# Patient Record
Sex: MALE | Race: WHITE | NOT HISPANIC OR LATINO
[De-identification: names, ages, dates, MRNs, and addresses within clinical notes are randomized per-mention and may not be internally consistent; named-entity substitution may affect disease eponyms.]

---

## 2017-05-26 ENCOUNTER — APPOINTMENT (OUTPATIENT)
Dept: OPHTHALMOLOGY | Facility: CLINIC | Age: 72
End: 2017-05-26

## 2017-05-26 DIAGNOSIS — H26.492 OTHER SECONDARY CATARACT, LEFT EYE: ICD-10-CM

## 2017-05-26 DIAGNOSIS — H35.373 PUCKERING OF MACULA, BILATERAL: ICD-10-CM

## 2017-05-26 DIAGNOSIS — H43.813 VITREOUS DEGENERATION, BILATERAL: ICD-10-CM

## 2018-05-08 ENCOUNTER — OUTPATIENT (OUTPATIENT)
Dept: OUTPATIENT SERVICES | Facility: HOSPITAL | Age: 73
LOS: 1 days | End: 2018-05-08
Payer: COMMERCIAL

## 2018-05-08 ENCOUNTER — RESULT REVIEW (OUTPATIENT)
Age: 73
End: 2018-05-08

## 2018-05-08 DIAGNOSIS — C61 MALIGNANT NEOPLASM OF PROSTATE: ICD-10-CM

## 2018-05-08 LAB — SURGICAL PATHOLOGY STUDY: SIGNIFICANT CHANGE UP

## 2018-05-08 PROCEDURE — 88321 CONSLTJ&REPRT SLD PREP ELSWR: CPT

## 2018-05-30 ENCOUNTER — RESULT REVIEW (OUTPATIENT)
Age: 73
End: 2018-05-30

## 2018-05-30 ENCOUNTER — INPATIENT (INPATIENT)
Facility: HOSPITAL | Age: 73
LOS: 1 days | Discharge: HOME | DRG: 708 | End: 2018-06-01
Attending: UROLOGY | Admitting: UROLOGY
Payer: COMMERCIAL

## 2018-05-30 VITALS
WEIGHT: 166.23 LBS | OXYGEN SATURATION: 98 % | HEIGHT: 70.5 IN | RESPIRATION RATE: 16 BRPM | DIASTOLIC BLOOD PRESSURE: 71 MMHG | TEMPERATURE: 97 F | SYSTOLIC BLOOD PRESSURE: 131 MMHG | HEART RATE: 58 BPM

## 2018-05-30 DIAGNOSIS — Z98.890 OTHER SPECIFIED POSTPROCEDURAL STATES: Chronic | ICD-10-CM

## 2018-05-30 DIAGNOSIS — Z90.49 ACQUIRED ABSENCE OF OTHER SPECIFIED PARTS OF DIGESTIVE TRACT: Chronic | ICD-10-CM

## 2018-05-30 LAB
ANION GAP SERPL CALC-SCNC: 16 MMOL/L — SIGNIFICANT CHANGE UP (ref 5–17)
BUN SERPL-MCNC: 15 MG/DL — SIGNIFICANT CHANGE UP (ref 7–23)
CALCIUM SERPL-MCNC: 9.3 MG/DL — SIGNIFICANT CHANGE UP (ref 8.4–10.5)
CHLORIDE SERPL-SCNC: 98 MMOL/L — SIGNIFICANT CHANGE UP (ref 96–108)
CO2 SERPL-SCNC: 26 MMOL/L — SIGNIFICANT CHANGE UP (ref 22–31)
CREAT SERPL-MCNC: 0.86 MG/DL — SIGNIFICANT CHANGE UP (ref 0.5–1.3)
GLUCOSE SERPL-MCNC: 139 MG/DL — HIGH (ref 70–99)
HCT VFR BLD CALC: 39.8 % — SIGNIFICANT CHANGE UP (ref 39–50)
HGB BLD-MCNC: 13.2 G/DL — SIGNIFICANT CHANGE UP (ref 13–17)
MCHC RBC-ENTMCNC: 30.4 PG — SIGNIFICANT CHANGE UP (ref 27–34)
MCHC RBC-ENTMCNC: 33.2 G/DL — SIGNIFICANT CHANGE UP (ref 32–36)
MCV RBC AUTO: 91.7 FL — SIGNIFICANT CHANGE UP (ref 80–100)
PLATELET # BLD AUTO: 206 K/UL — SIGNIFICANT CHANGE UP (ref 150–400)
POTASSIUM SERPL-MCNC: 3.7 MMOL/L — SIGNIFICANT CHANGE UP (ref 3.5–5.3)
POTASSIUM SERPL-SCNC: 3.7 MMOL/L — SIGNIFICANT CHANGE UP (ref 3.5–5.3)
RBC # BLD: 4.34 M/UL — SIGNIFICANT CHANGE UP (ref 4.2–5.8)
RBC # FLD: 14.8 % — SIGNIFICANT CHANGE UP (ref 10.3–16.9)
SODIUM SERPL-SCNC: 140 MMOL/L — SIGNIFICANT CHANGE UP (ref 135–145)
WBC # BLD: 12.2 K/UL — HIGH (ref 3.8–10.5)
WBC # FLD AUTO: 12.2 K/UL — HIGH (ref 3.8–10.5)

## 2018-05-30 RX ORDER — SODIUM CHLORIDE 9 MG/ML
1000 INJECTION INTRAMUSCULAR; INTRAVENOUS; SUBCUTANEOUS
Qty: 0 | Refills: 0 | Status: DISCONTINUED | OUTPATIENT
Start: 2018-05-30 | End: 2018-06-01

## 2018-05-30 RX ORDER — DIAZEPAM 5 MG
5 TABLET ORAL THREE TIMES A DAY
Qty: 0 | Refills: 0 | Status: DISCONTINUED | OUTPATIENT
Start: 2018-05-30 | End: 2018-06-01

## 2018-05-30 RX ORDER — METOPROLOL TARTRATE 50 MG
50 TABLET ORAL
Qty: 0 | Refills: 0 | Status: DISCONTINUED | OUTPATIENT
Start: 2018-05-30 | End: 2018-06-01

## 2018-05-30 RX ORDER — LIDOCAINE 4 G/100G
1 CREAM TOPICAL
Qty: 0 | Refills: 0 | Status: DISCONTINUED | OUTPATIENT
Start: 2018-05-30 | End: 2018-06-01

## 2018-05-30 RX ORDER — ACETAMINOPHEN 500 MG
650 TABLET ORAL EVERY 6 HOURS
Qty: 0 | Refills: 0 | Status: DISCONTINUED | OUTPATIENT
Start: 2018-05-30 | End: 2018-06-01

## 2018-05-30 RX ORDER — OXYBUTYNIN CHLORIDE 5 MG
5 TABLET ORAL THREE TIMES A DAY
Qty: 0 | Refills: 0 | Status: DISCONTINUED | OUTPATIENT
Start: 2018-05-30 | End: 2018-06-01

## 2018-05-30 RX ORDER — HYDROMORPHONE HYDROCHLORIDE 2 MG/ML
0.5 INJECTION INTRAMUSCULAR; INTRAVENOUS; SUBCUTANEOUS ONCE
Qty: 0 | Refills: 0 | Status: DISCONTINUED | OUTPATIENT
Start: 2018-05-30 | End: 2018-06-01

## 2018-05-30 RX ORDER — CEFTRIAXONE 500 MG/1
1 INJECTION, POWDER, FOR SOLUTION INTRAMUSCULAR; INTRAVENOUS EVERY 24 HOURS
Qty: 0 | Refills: 0 | Status: COMPLETED | OUTPATIENT
Start: 2018-05-30 | End: 2018-05-31

## 2018-05-30 RX ORDER — ONDANSETRON 8 MG/1
4 TABLET, FILM COATED ORAL EVERY 6 HOURS
Qty: 0 | Refills: 0 | Status: DISCONTINUED | OUTPATIENT
Start: 2018-05-30 | End: 2018-06-01

## 2018-05-30 RX ORDER — ZOLPIDEM TARTRATE 10 MG/1
5 TABLET ORAL AT BEDTIME
Qty: 0 | Refills: 0 | Status: DISCONTINUED | OUTPATIENT
Start: 2018-05-30 | End: 2018-06-01

## 2018-05-30 RX ORDER — SENNA PLUS 8.6 MG/1
2 TABLET ORAL AT BEDTIME
Qty: 0 | Refills: 0 | Status: DISCONTINUED | OUTPATIENT
Start: 2018-05-30 | End: 2018-06-01

## 2018-05-30 RX ORDER — VALSARTAN 80 MG/1
320 TABLET ORAL DAILY
Qty: 0 | Refills: 0 | Status: DISCONTINUED | OUTPATIENT
Start: 2018-05-30 | End: 2018-06-01

## 2018-05-30 RX ORDER — OXYCODONE AND ACETAMINOPHEN 5; 325 MG/1; MG/1
2 TABLET ORAL EVERY 6 HOURS
Qty: 0 | Refills: 0 | Status: DISCONTINUED | OUTPATIENT
Start: 2018-05-30 | End: 2018-06-01

## 2018-05-30 RX ORDER — OXYCODONE AND ACETAMINOPHEN 5; 325 MG/1; MG/1
1 TABLET ORAL EVERY 4 HOURS
Qty: 0 | Refills: 0 | Status: DISCONTINUED | OUTPATIENT
Start: 2018-05-30 | End: 2018-06-01

## 2018-05-30 RX ORDER — ATORVASTATIN CALCIUM 80 MG/1
80 TABLET, FILM COATED ORAL AT BEDTIME
Qty: 0 | Refills: 0 | Status: DISCONTINUED | OUTPATIENT
Start: 2018-05-30 | End: 2018-06-01

## 2018-05-30 RX ORDER — DOCUSATE SODIUM 100 MG
100 CAPSULE ORAL THREE TIMES A DAY
Qty: 0 | Refills: 0 | Status: DISCONTINUED | OUTPATIENT
Start: 2018-05-30 | End: 2018-06-01

## 2018-05-30 RX ADMIN — SENNA PLUS 2 TABLET(S): 8.6 TABLET ORAL at 21:23

## 2018-05-30 RX ADMIN — Medication 5 MILLIGRAM(S): at 10:12

## 2018-05-30 RX ADMIN — Medication 650 MILLIGRAM(S): at 16:05

## 2018-05-30 RX ADMIN — Medication 50 MILLIGRAM(S): at 21:24

## 2018-05-30 RX ADMIN — ATORVASTATIN CALCIUM 80 MILLIGRAM(S): 80 TABLET, FILM COATED ORAL at 22:30

## 2018-05-30 RX ADMIN — ZOLPIDEM TARTRATE 5 MILLIGRAM(S): 10 TABLET ORAL at 22:30

## 2018-05-30 RX ADMIN — Medication 650 MILLIGRAM(S): at 15:05

## 2018-05-30 RX ADMIN — Medication 100 MILLIGRAM(S): at 21:24

## 2018-05-30 RX ADMIN — Medication 100 MILLIGRAM(S): at 14:43

## 2018-05-30 RX ADMIN — Medication 5 MILLIGRAM(S): at 18:39

## 2018-05-30 RX ADMIN — CEFTRIAXONE 100 GRAM(S): 500 INJECTION, POWDER, FOR SOLUTION INTRAMUSCULAR; INTRAVENOUS at 14:43

## 2018-05-30 RX ADMIN — Medication 5 MILLIGRAM(S): at 10:14

## 2018-05-30 RX ADMIN — Medication 5 MILLIGRAM(S): at 15:05

## 2018-05-30 RX ADMIN — Medication 5 MILLIGRAM(S): at 11:53

## 2018-05-30 NOTE — H&P ADULT - HISTORY OF PRESENT ILLNESS
71 yo M w/ PMH a-fib s/p ablation, CHF, HTN, HLD with prostate Ca here for RALP. Denies f/c/n/v/cp/sob.

## 2018-05-30 NOTE — H&P ADULT - ASSESSMENT
73 yo M with h/o afib, chf, htn, hld, prostate Ca here for robotic assisted laparoscopic prostatectomy  -to OR for RALP  -admit to floor post op  -npo/ivf  -HSQ/SCDs

## 2018-05-30 NOTE — PROGRESS NOTE ADULT - SUBJECTIVE AND OBJECTIVE BOX
Postop:  Pt denies chest pain, SOB, NV or severe abdominal pain    Vital Signs Last 24 Hrs  T(C): 36.1 (30 May 2018 06:16), Max: 36.1 (30 May 2018 06:16)  T(F): 97 (30 May 2018 06:16), Max: 97 (30 May 2018 06:16)  HR: 82 (30 May 2018 09:41) (58 - 82)  BP: 124/61 (30 May 2018 09:41) (124/61 - 144/72)  BP(mean): 87 (30 May 2018 09:41) (87 - 101)  RR: 19 (30 May 2018 09:41) (11 - 19)  SpO2: 98% (30 May 2018 09:41) (97% - 98%)    I&O's Summary    30 May 2018 07:01  -  30 May 2018 10:55  --------------------------------------------------------  IN: 0 mL / OUT: 200 mL / NET: -200 mL    Gen: NAD  Abd: softly distended, judy TTP, incisions CDI  : hamm draining clear                           13.2   12.2  )-----------( 206      ( 30 May 2018 10:14 )             39.8     05-30    140  |  98  |  15  ----------------------------<  139<H>  3.7   |  26  |  0.86    Ca    9.3      30 May 2018 10:14      cultures    A/P: 72M hx cap s/p RALP 5/30  1- Hamm- monitor uop  2. GLORIA- monitor output  3- Clear diet  4- Ceftriaxone    5- Pain meds PRN  6- OOB amb, IS

## 2018-05-31 DIAGNOSIS — C61 MALIGNANT NEOPLASM OF PROSTATE: ICD-10-CM

## 2018-05-31 LAB
ANION GAP SERPL CALC-SCNC: 12 MMOL/L — SIGNIFICANT CHANGE UP (ref 5–17)
BASOPHILS NFR BLD AUTO: 0.1 % — SIGNIFICANT CHANGE UP (ref 0–2)
BUN SERPL-MCNC: 12 MG/DL — SIGNIFICANT CHANGE UP (ref 7–23)
CALCIUM SERPL-MCNC: 8.9 MG/DL — SIGNIFICANT CHANGE UP (ref 8.4–10.5)
CHLORIDE SERPL-SCNC: 99 MMOL/L — SIGNIFICANT CHANGE UP (ref 96–108)
CO2 SERPL-SCNC: 27 MMOL/L — SIGNIFICANT CHANGE UP (ref 22–31)
CREAT FLD-MCNC: 0.7 MG/DL — SIGNIFICANT CHANGE UP
CREAT SERPL-MCNC: 0.7 MG/DL — SIGNIFICANT CHANGE UP (ref 0.5–1.3)
EOSINOPHIL NFR BLD AUTO: 0.4 % — SIGNIFICANT CHANGE UP (ref 0–6)
GLUCOSE SERPL-MCNC: 105 MG/DL — HIGH (ref 70–99)
HCT VFR BLD CALC: 35.5 % — LOW (ref 39–50)
HGB BLD-MCNC: 11.8 G/DL — LOW (ref 13–17)
LYMPHOCYTES # BLD AUTO: 14.7 % — SIGNIFICANT CHANGE UP (ref 13–44)
MAGNESIUM SERPL-MCNC: 2.1 MG/DL — SIGNIFICANT CHANGE UP (ref 1.6–2.6)
MCHC RBC-ENTMCNC: 30.7 PG — SIGNIFICANT CHANGE UP (ref 27–34)
MCHC RBC-ENTMCNC: 33.2 G/DL — SIGNIFICANT CHANGE UP (ref 32–36)
MCV RBC AUTO: 92.4 FL — SIGNIFICANT CHANGE UP (ref 80–100)
MONOCYTES NFR BLD AUTO: 14.3 % — HIGH (ref 2–14)
NEUTROPHILS NFR BLD AUTO: 70.5 % — SIGNIFICANT CHANGE UP (ref 43–77)
PHOSPHATE SERPL-MCNC: 4 MG/DL — SIGNIFICANT CHANGE UP (ref 2.5–4.5)
PLATELET # BLD AUTO: 201 K/UL — SIGNIFICANT CHANGE UP (ref 150–400)
POTASSIUM SERPL-MCNC: 3.2 MMOL/L — LOW (ref 3.5–5.3)
POTASSIUM SERPL-SCNC: 3.2 MMOL/L — LOW (ref 3.5–5.3)
RBC # BLD: 3.84 M/UL — LOW (ref 4.2–5.8)
RBC # FLD: 14.8 % — SIGNIFICANT CHANGE UP (ref 10.3–16.9)
SODIUM SERPL-SCNC: 138 MMOL/L — SIGNIFICANT CHANGE UP (ref 135–145)
WBC # BLD: 9.7 K/UL — SIGNIFICANT CHANGE UP (ref 3.8–10.5)
WBC # FLD AUTO: 9.7 K/UL — SIGNIFICANT CHANGE UP (ref 3.8–10.5)

## 2018-05-31 RX ORDER — POTASSIUM CHLORIDE 20 MEQ
10 PACKET (EA) ORAL
Qty: 0 | Refills: 0 | Status: COMPLETED | OUTPATIENT
Start: 2018-05-31 | End: 2018-05-31

## 2018-05-31 RX ORDER — POTASSIUM CHLORIDE 20 MEQ
20 PACKET (EA) ORAL ONCE
Qty: 0 | Refills: 0 | Status: COMPLETED | OUTPATIENT
Start: 2018-05-31 | End: 2018-05-31

## 2018-05-31 RX ADMIN — Medication 20 MILLIEQUIVALENT(S): at 07:43

## 2018-05-31 RX ADMIN — Medication 100 MILLIEQUIVALENT(S): at 10:45

## 2018-05-31 RX ADMIN — Medication 650 MILLIGRAM(S): at 07:50

## 2018-05-31 RX ADMIN — Medication 100 MILLIGRAM(S): at 21:35

## 2018-05-31 RX ADMIN — VALSARTAN 320 MILLIGRAM(S): 80 TABLET ORAL at 05:14

## 2018-05-31 RX ADMIN — Medication 5 MILLIGRAM(S): at 13:37

## 2018-05-31 RX ADMIN — Medication 50 MILLIGRAM(S): at 21:35

## 2018-05-31 RX ADMIN — Medication 100 MILLIGRAM(S): at 13:21

## 2018-05-31 RX ADMIN — SENNA PLUS 2 TABLET(S): 8.6 TABLET ORAL at 21:35

## 2018-05-31 RX ADMIN — Medication 5 MILLIGRAM(S): at 02:40

## 2018-05-31 RX ADMIN — Medication 5 MILLIGRAM(S): at 20:34

## 2018-05-31 RX ADMIN — Medication 5 MILLIGRAM(S): at 18:00

## 2018-05-31 RX ADMIN — ATORVASTATIN CALCIUM 80 MILLIGRAM(S): 80 TABLET, FILM COATED ORAL at 21:35

## 2018-05-31 RX ADMIN — Medication 100 MILLIEQUIVALENT(S): at 08:47

## 2018-05-31 RX ADMIN — Medication 100 MILLIEQUIVALENT(S): at 07:43

## 2018-05-31 RX ADMIN — Medication 650 MILLIGRAM(S): at 01:00

## 2018-05-31 RX ADMIN — Medication 650 MILLIGRAM(S): at 00:17

## 2018-05-31 RX ADMIN — CEFTRIAXONE 100 GRAM(S): 500 INJECTION, POWDER, FOR SOLUTION INTRAMUSCULAR; INTRAVENOUS at 13:21

## 2018-05-31 RX ADMIN — Medication 650 MILLIGRAM(S): at 06:50

## 2018-05-31 RX ADMIN — Medication 100 MILLIGRAM(S): at 05:14

## 2018-05-31 RX ADMIN — Medication 5 MILLIGRAM(S): at 08:09

## 2018-05-31 RX ADMIN — Medication 50 MILLIGRAM(S): at 08:09

## 2018-05-31 RX ADMIN — Medication 5 MILLIGRAM(S): at 00:17

## 2018-05-31 NOTE — PROGRESS NOTE ADULT - SUBJECTIVE AND OBJECTIVE BOX
AM Note    No acute events overnight.      Vital Signs Last 24 Hrs  T(C): 36.4 (05-31-18 @ 01:27), Max: 36.9 (05-30-18 @ 20:48)  T(F): 97.6 (05-31-18 @ 01:27), Max: 98.5 (05-30-18 @ 20:48)  HR: 66 (05-31-18 @ 01:27) (58 - 85)  BP: 139/90 (05-31-18 @ 01:27) (124/61 - 160/86)  BP(mean): 117 (05-30-18 @ 10:37) (87 - 117)  RR: 16 (05-31-18 @ 01:27) (8 - 27)  SpO2: 100% (05-31-18 @ 01:27) (96% - 100%)     30 May 2018 10:14    140    |  98     |  15     ----------------------------<  139    3.7     |  26     |  0.86     Ca    9.3        30 May 2018 10:14                            13.2   12.2  )-----------( 206      ( 30 May 2018 10:14 )             39.8         I&O's Summary    30 May 2018 07:01  -  31 May 2018 04:31  --------------------------------------------------------  IN: 1250 mL / OUT: 2750 mL / NET: -1500 mL          PHYSICAL EXAM:    GEN: NAD  ABD: ntnd, soft, inc cdi, GLORIA in place draining serosanguinous  : hamm in place draining clear

## 2018-05-31 NOTE — PROGRESS NOTE ADULT - PROBLEM SELECTOR PLAN 1
-hansel, monitor uop  -GLORIA, monitor op  -diet clears  -pain meds prn  -oob, amb  -continue present care

## 2018-06-01 ENCOUNTER — TRANSCRIPTION ENCOUNTER (OUTPATIENT)
Age: 73
End: 2018-06-01

## 2018-06-01 VITALS
SYSTOLIC BLOOD PRESSURE: 160 MMHG | OXYGEN SATURATION: 99 % | HEART RATE: 67 BPM | TEMPERATURE: 98 F | DIASTOLIC BLOOD PRESSURE: 82 MMHG | RESPIRATION RATE: 17 BRPM

## 2018-06-01 LAB
ANION GAP SERPL CALC-SCNC: 11 MMOL/L — SIGNIFICANT CHANGE UP (ref 5–17)
BASOPHILS NFR BLD AUTO: 0.4 % — SIGNIFICANT CHANGE UP (ref 0–2)
BUN SERPL-MCNC: 9 MG/DL — SIGNIFICANT CHANGE UP (ref 7–23)
CALCIUM SERPL-MCNC: 9 MG/DL — SIGNIFICANT CHANGE UP (ref 8.4–10.5)
CHLORIDE SERPL-SCNC: 102 MMOL/L — SIGNIFICANT CHANGE UP (ref 96–108)
CO2 SERPL-SCNC: 29 MMOL/L — SIGNIFICANT CHANGE UP (ref 22–31)
CREAT SERPL-MCNC: 0.82 MG/DL — SIGNIFICANT CHANGE UP (ref 0.5–1.3)
EOSINOPHIL NFR BLD AUTO: 2.3 % — SIGNIFICANT CHANGE UP (ref 0–6)
GLUCOSE SERPL-MCNC: 95 MG/DL — SIGNIFICANT CHANGE UP (ref 70–99)
HCT VFR BLD CALC: 37.7 % — LOW (ref 39–50)
HGB BLD-MCNC: 12.2 G/DL — LOW (ref 13–17)
LYMPHOCYTES # BLD AUTO: 24.4 % — SIGNIFICANT CHANGE UP (ref 13–44)
MAGNESIUM SERPL-MCNC: 2.1 MG/DL — SIGNIFICANT CHANGE UP (ref 1.6–2.6)
MCHC RBC-ENTMCNC: 30.6 PG — SIGNIFICANT CHANGE UP (ref 27–34)
MCHC RBC-ENTMCNC: 32.4 G/DL — SIGNIFICANT CHANGE UP (ref 32–36)
MCV RBC AUTO: 94.5 FL — SIGNIFICANT CHANGE UP (ref 80–100)
MONOCYTES NFR BLD AUTO: 16.5 % — HIGH (ref 2–14)
NEUTROPHILS NFR BLD AUTO: 56.4 % — SIGNIFICANT CHANGE UP (ref 43–77)
PHOSPHATE SERPL-MCNC: 2.9 MG/DL — SIGNIFICANT CHANGE UP (ref 2.5–4.5)
PLATELET # BLD AUTO: 216 K/UL — SIGNIFICANT CHANGE UP (ref 150–400)
POTASSIUM SERPL-MCNC: 3.6 MMOL/L — SIGNIFICANT CHANGE UP (ref 3.5–5.3)
POTASSIUM SERPL-SCNC: 3.6 MMOL/L — SIGNIFICANT CHANGE UP (ref 3.5–5.3)
RBC # BLD: 3.99 M/UL — LOW (ref 4.2–5.8)
RBC # FLD: 15 % — SIGNIFICANT CHANGE UP (ref 10.3–16.9)
SODIUM SERPL-SCNC: 142 MMOL/L — SIGNIFICANT CHANGE UP (ref 135–145)
SURGICAL PATHOLOGY STUDY: SIGNIFICANT CHANGE UP
WBC # BLD: 8.2 K/UL — SIGNIFICANT CHANGE UP (ref 3.8–10.5)
WBC # FLD AUTO: 8.2 K/UL — SIGNIFICANT CHANGE UP (ref 3.8–10.5)

## 2018-06-01 PROCEDURE — 88307 TISSUE EXAM BY PATHOLOGIST: CPT

## 2018-06-01 PROCEDURE — 36415 COLL VENOUS BLD VENIPUNCTURE: CPT

## 2018-06-01 PROCEDURE — 83735 ASSAY OF MAGNESIUM: CPT

## 2018-06-01 PROCEDURE — 85027 COMPLETE CBC AUTOMATED: CPT

## 2018-06-01 PROCEDURE — 84100 ASSAY OF PHOSPHORUS: CPT

## 2018-06-01 PROCEDURE — 86901 BLOOD TYPING SEROLOGIC RH(D): CPT

## 2018-06-01 PROCEDURE — 80048 BASIC METABOLIC PNL TOTAL CA: CPT

## 2018-06-01 PROCEDURE — S2900: CPT

## 2018-06-01 PROCEDURE — 86850 RBC ANTIBODY SCREEN: CPT

## 2018-06-01 PROCEDURE — 85025 COMPLETE CBC W/AUTO DIFF WBC: CPT

## 2018-06-01 PROCEDURE — 88305 TISSUE EXAM BY PATHOLOGIST: CPT

## 2018-06-01 PROCEDURE — 88309 TISSUE EXAM BY PATHOLOGIST: CPT

## 2018-06-01 PROCEDURE — 82570 ASSAY OF URINE CREATININE: CPT

## 2018-06-01 PROCEDURE — 88331 PATH CONSLTJ SURG 1 BLK 1SPC: CPT

## 2018-06-01 PROCEDURE — 86900 BLOOD TYPING SEROLOGIC ABO: CPT

## 2018-06-01 RX ORDER — POTASSIUM CHLORIDE 20 MEQ
20 PACKET (EA) ORAL ONCE
Qty: 0 | Refills: 0 | Status: COMPLETED | OUTPATIENT
Start: 2018-06-01 | End: 2018-06-01

## 2018-06-01 RX ORDER — MOXIFLOXACIN HYDROCHLORIDE TABLETS, 400 MG 400 MG/1
1 TABLET, FILM COATED ORAL
Qty: 10 | Refills: 0 | OUTPATIENT
Start: 2018-06-01 | End: 2018-06-05

## 2018-06-01 RX ORDER — OXYBUTYNIN CHLORIDE 5 MG
1 TABLET ORAL
Qty: 6 | Refills: 0 | OUTPATIENT
Start: 2018-06-01 | End: 2018-06-06

## 2018-06-01 RX ORDER — APIXABAN 2.5 MG/1
1 TABLET, FILM COATED ORAL
Qty: 0 | Refills: 0 | COMMUNITY

## 2018-06-01 RX ORDER — DIAZEPAM 5 MG
1 TABLET ORAL
Qty: 5 | Refills: 0 | OUTPATIENT
Start: 2018-06-01 | End: 2018-06-05

## 2018-06-01 RX ORDER — DOCUSATE SODIUM 100 MG
1 CAPSULE ORAL
Qty: 60 | Refills: 0 | OUTPATIENT
Start: 2018-06-01 | End: 2018-06-30

## 2018-06-01 RX ORDER — SODIUM,POTASSIUM PHOSPHATES 278-250MG
1 POWDER IN PACKET (EA) ORAL ONCE
Qty: 0 | Refills: 0 | Status: COMPLETED | OUTPATIENT
Start: 2018-06-01 | End: 2018-06-01

## 2018-06-01 RX ADMIN — Medication 20 MILLIEQUIVALENT(S): at 12:19

## 2018-06-01 RX ADMIN — Medication 50 MILLIGRAM(S): at 12:19

## 2018-06-01 RX ADMIN — VALSARTAN 320 MILLIGRAM(S): 80 TABLET ORAL at 05:32

## 2018-06-01 RX ADMIN — Medication 1 TABLET(S): at 12:19

## 2018-06-01 RX ADMIN — Medication 5 MILLIGRAM(S): at 01:47

## 2018-06-01 RX ADMIN — Medication 650 MILLIGRAM(S): at 02:47

## 2018-06-01 RX ADMIN — Medication 650 MILLIGRAM(S): at 01:47

## 2018-06-01 RX ADMIN — Medication 100 MILLIGRAM(S): at 05:32

## 2018-06-01 NOTE — DISCHARGE NOTE ADULT - CARE PROVIDER_API CALL
Patrick Shah), Urology  [address] [place] [address], [place] [id number]  Phone: [phone]  Fax: [phone]

## 2018-06-01 NOTE — DISCHARGE NOTE ADULT - CARE PLAN
Principal Discharge DX:	Prostate cancer  Goal:	ambulation, pain control Principal Discharge DX:	Prostate cancer  Goal:	ambulation, pain control  Assessment and plan of treatment:	patient will follow up with  in office

## 2018-06-01 NOTE — PROGRESS NOTE ADULT - PROBLEM SELECTOR PLAN 1
-hnasel, monitor uop  -GLORIA, monitor op  -diet, soft  -abx  -pain meds prn  -oob, amb  -continue present care

## 2018-06-01 NOTE — DISCHARGE NOTE ADULT - PATIENT PORTAL LINK FT
You can access the [facility] Patient Portal, offered by [facility], by registering with the following website: [email or web address]

## 2018-06-01 NOTE — DISCHARGE NOTE ADULT - ADDITIONAL INSTRUCTIONS
Stay well hydrated, May shower. Keep dressing clean and Dry, can remove 24hrs after discharge. No strenuous activity until you speak with your Surgeon. You can take Tylenol PRN for pain. Hold any aspirin, or anticoagulation therapy until speaking with your surgeon. Call Dr Shah at [phone] with fever >100.4, questions and to set up your follow up appointment

## 2018-06-01 NOTE — DISCHARGE NOTE ADULT - MEDICATION SUMMARY - MEDICATIONS TO TAKE
I will START or STAY ON the medications listed below when I get home from the hospital:    valsartan 320 mg oral tablet  -- 1 tab(s) by mouth once a day 320-12.5M  -- Indication: For History of appendectomy    Crestor 20 mg oral tablet  -- 1 tab(s) by mouth once a day (at bedtime)  -- Indication: For Home med    metoprolol tartrate 50 mg oral tablet  -- 1 tab(s) by mouth 2 times a day  -- Indication: For Home med    Colace 100 mg oral capsule  -- 1 cap(s) by mouth 2 times a day, As Needed -for constipation   -- Medication should be taken with plenty of water.    -- Indication: For Constipation     potassium chloride 20 mEq oral tablet, extended release  -- 1  by mouth once a day  -- Indication: For Home med     Cipro 500 mg oral tablet  -- 1 tab(s) by mouth every 12 hours.Start the DAY BEFORE catheter removal   -- Avoid prolonged or excessive exposure to direct and/or artificial sunlight while taking this medication.  Check with your doctor before becoming pregnant.  Do not take dairy products, antacids, or iron preparations within one hour of this medication.  Finish all this medication unless otherwise directed by prescriber.  Medication should be taken with plenty of water.    -- Indication: For Antibioti    Ditropan XL 15 mg/24 hr oral tablet, extended release  -- 1 tab(s) by mouth once a day, As Needed -for bladder spasms. Stop the DAY BEFORE catheter removal   -- May cause drowsiness.  Alcohol may intensify this effect.  Use care when operating dangerous machinery.  Swallow whole.  Do not crush.    -- Indication: For bladder spasms I will START or STAY ON the medications listed below when I get home from the hospital:    valsartan 320 mg oral tablet  -- 1 tab(s) by mouth once a day 320-12.5M  -- Indication: For Home med    Crestor 20 mg oral tablet  -- 1 tab(s) by mouth once a day (at bedtime)  -- Indication: For Home med    metoprolol tartrate 50 mg oral tablet  -- 1 tab(s) by mouth 2 times a day  -- Indication: For Home med    Colace 100 mg oral capsule  -- 1 cap(s) by mouth 2 times a day, As Needed -for congestion - for constipation  -- Medication should be taken with plenty of water.    -- Indication: For Constipation    potassium chloride 20 mEq oral tablet, extended release  -- 1  by mouth once a day  -- Indication: For Home med     Cipro 500 mg oral tablet  -- 1 tab(s) by mouth every 12 hours. START day before removal of catheter   -- Avoid prolonged or excessive exposure to direct and/or artificial sunlight while taking this medication.  Check with your doctor before becoming pregnant.  Do not take dairy products, antacids, or iron preparations within one hour of this medication.  Finish all this medication unless otherwise directed by prescriber.  Medication should be taken with plenty of water.    -- Indication: For Antibiotic    Ditropan XL 15 mg/24 hr oral tablet, extended release  -- 1 tab(s) by mouth once a day, As Needed -for bladder spasms . STOP the day before catheter removal   -- May cause drowsiness.  Alcohol may intensify this effect.  Use care when operating dangerous machinery.  Swallow whole.  Do not crush.    -- Indication: For bladder spasms I will START or STAY ON the medications listed below when I get home from the hospital:    valsartan 320 mg oral tablet  -- 1 tab(s) by mouth once a day 320-12.5M  -- Indication: For Home med    Valium 5 mg oral tablet  -- 1 tab(s) by mouth once x 5 days, As Needed -for anxiety MDD:1  -- Caution federal law prohibits the transfer of this drug to any person other  than the person for whom it was prescribed.  Do not take this drug if you are pregnant.  May cause drowsiness.  Alcohol may intensify this effect.  Use care when operating dangerous machinery.    -- Indication: For As needed for anxiety     Crestor 20 mg oral tablet  -- 1 tab(s) by mouth once a day (at bedtime)  -- Indication: For Home med    metoprolol tartrate 50 mg oral tablet  -- 1 tab(s) by mouth 2 times a day  -- Indication: For Home med    potassium chloride 20 mEq oral tablet, extended release  -- 1  by mouth once a day  -- Indication: For Home med

## 2018-06-01 NOTE — DISCHARGE NOTE ADULT - INSTRUCTIONS
Stay well hydrated, May shower. Keep dressing clean and Dry, can remove 24hrs after discharge. No strenuous activity until you speak with your Surgeon. Hold any aspirin, or anticoagulation therapy until speaking with your surgeon. Call Dr Shah at [phone] with fever >100.4, questions and to set up your follow up appointment Continue soft diet and hydrate well

## 2018-06-01 NOTE — PROGRESS NOTE ADULT - SUBJECTIVE AND OBJECTIVE BOX
AM Note    No acute events overnight.      Vital Signs Last 24 Hrs  T(C): 36.7 (06-01-18 @ 01:39), Max: 36.9 (05-31-18 @ 21:33)  T(F): 98.1 (06-01-18 @ 01:39), Max: 98.5 (05-31-18 @ 21:33)  HR: 68 (06-01-18 @ 01:39) (57 - 75)  BP: 147/96 (06-01-18 @ 01:39) (134/76 - 157/89)  BP(mean): --  RR: 16 (06-01-18 @ 01:39) (16 - 17)  SpO2: 100% (06-01-18 @ 01:39) (98% - 100%)     31 May 2018 06:40    138    |  99     |  12     ----------------------------<  105    3.2     |  27     |  0.70     Ca    8.9        31 May 2018 06:40  Phos  4.0       31 May 2018 06:40  Mg     2.1       31 May 2018 06:40                            11.8   9.7   )-----------( 201      ( 31 May 2018 06:40 )             35.5         I&O's Summary    30 May 2018 07:01  -  31 May 2018 07:00  --------------------------------------------------------  IN: 1500 mL / OUT: 2908 mL / NET: -1408 mL    31 May 2018 07:01  -  01 Jun 2018 04:46  --------------------------------------------------------  IN: 0 mL / OUT: 2580 mL / NET: -2580 mL          PHYSICAL EXAM:    GEN: NAD  ABD: soft, ntnd, incs cdi, GLORIA in place draining clear  : hansel in place draining clear

## 2018-06-01 NOTE — DISCHARGE NOTE ADULT - HOSPITAL COURSE
72M with prostate cancer s/p underwent robotic assisted laparoscopic prostatectomy on 5/30. He is doing well and has been ambulating.   Tolerated procedure well, VSS, afebrile, and hemodynamically stable. He will go home hamm catheter, GLORIA removed in the afternoon.

## 2018-06-04 DIAGNOSIS — C61 MALIGNANT NEOPLASM OF PROSTATE: ICD-10-CM

## 2018-06-04 DIAGNOSIS — E78.5 HYPERLIPIDEMIA, UNSPECIFIED: ICD-10-CM

## 2018-06-04 DIAGNOSIS — I48.91 UNSPECIFIED ATRIAL FIBRILLATION: ICD-10-CM

## 2018-06-11 ENCOUNTER — OUTPATIENT (OUTPATIENT)
Dept: OUTPATIENT SERVICES | Facility: HOSPITAL | Age: 73
LOS: 1 days | Discharge: HOME | End: 2018-06-11
Payer: COMMERCIAL

## 2018-06-11 VITALS
DIASTOLIC BLOOD PRESSURE: 87 MMHG | SYSTOLIC BLOOD PRESSURE: 166 MMHG | OXYGEN SATURATION: 97 % | RESPIRATION RATE: 16 BRPM | HEART RATE: 78 BPM

## 2018-06-11 VITALS
OXYGEN SATURATION: 96 % | TEMPERATURE: 98 F | SYSTOLIC BLOOD PRESSURE: 165 MMHG | HEART RATE: 64 BPM | WEIGHT: 165.57 LBS | HEIGHT: 67 IN | RESPIRATION RATE: 16 BRPM | DIASTOLIC BLOOD PRESSURE: 83 MMHG

## 2018-06-11 DIAGNOSIS — Z98.890 OTHER SPECIFIED POSTPROCEDURAL STATES: Chronic | ICD-10-CM

## 2018-06-11 DIAGNOSIS — Z90.49 ACQUIRED ABSENCE OF OTHER SPECIFIED PARTS OF DIGESTIVE TRACT: Chronic | ICD-10-CM

## 2018-06-11 DIAGNOSIS — Z90.79 ACQUIRED ABSENCE OF OTHER GENITAL ORGAN(S): Chronic | ICD-10-CM

## 2018-06-11 PROCEDURE — 74455 X-RAY URETHRA/BLADDER: CPT

## 2018-06-11 PROCEDURE — 51600 INJECTION FOR BLADDER X-RAY: CPT

## 2018-06-11 RX ORDER — PHENAZOPYRIDINE HCL 100 MG
100 TABLET ORAL ONCE
Qty: 0 | Refills: 0 | Status: DISCONTINUED | OUTPATIENT
Start: 2018-06-11 | End: 2018-06-11

## 2018-06-11 RX ORDER — MOXIFLOXACIN HYDROCHLORIDE TABLETS, 400 MG 400 MG/1
1 TABLET, FILM COATED ORAL
Qty: 0 | Refills: 0 | COMMUNITY

## 2018-06-11 RX ORDER — POTASSIUM CHLORIDE 20 MEQ
1 PACKET (EA) ORAL
Qty: 0 | Refills: 0 | COMMUNITY

## 2018-06-11 RX ORDER — VALSARTAN 80 MG/1
1 TABLET ORAL
Qty: 0 | Refills: 0 | COMMUNITY

## 2018-06-11 RX ORDER — METOPROLOL TARTRATE 50 MG
1 TABLET ORAL
Qty: 0 | Refills: 0 | COMMUNITY

## 2018-06-11 RX ORDER — ROSUVASTATIN CALCIUM 5 MG/1
1 TABLET ORAL
Qty: 0 | Refills: 0 | COMMUNITY

## 2018-06-11 NOTE — PACU DISCHARGE NOTE - COMMENTS
pt verbalized understanding all discharged instructions  iv discontinuo  pt able to void 350 + 150 bladder scan done   pt lefta ccomaopneid by family kaelyn

## 2021-06-18 PROBLEM — I48.91 UNSPECIFIED ATRIAL FIBRILLATION: Chronic | Status: ACTIVE | Noted: 2018-05-30

## 2021-06-18 PROBLEM — E78.00 PURE HYPERCHOLESTEROLEMIA, UNSPECIFIED: Chronic | Status: ACTIVE | Noted: 2018-05-30

## 2021-06-18 PROBLEM — C61 MALIGNANT NEOPLASM OF PROSTATE: Chronic | Status: ACTIVE | Noted: 2018-05-30

## 2021-06-22 ENCOUNTER — OUTPATIENT (OUTPATIENT)
Dept: OUTPATIENT SERVICES | Facility: HOSPITAL | Age: 76
LOS: 1 days | Discharge: HOME | End: 2021-06-22
Payer: COMMERCIAL

## 2021-06-22 DIAGNOSIS — Z98.890 OTHER SPECIFIED POSTPROCEDURAL STATES: Chronic | ICD-10-CM

## 2021-06-22 DIAGNOSIS — Z90.49 ACQUIRED ABSENCE OF OTHER SPECIFIED PARTS OF DIGESTIVE TRACT: Chronic | ICD-10-CM

## 2021-06-22 DIAGNOSIS — Z90.79 ACQUIRED ABSENCE OF OTHER GENITAL ORGAN(S): Chronic | ICD-10-CM

## 2021-06-22 LAB — SARS-COV-2 RNA SPEC QL NAA+PROBE: NEGATIVE — SIGNIFICANT CHANGE UP

## 2021-06-22 PROCEDURE — 54405 INSERT MULTI-COMP PENIS PROS: CPT | Mod: AS

## 2021-06-22 PROCEDURE — 54360 PENIS PLASTIC SURGERY: CPT | Mod: AS
